# Patient Record
Sex: FEMALE | HISPANIC OR LATINO
[De-identification: names, ages, dates, MRNs, and addresses within clinical notes are randomized per-mention and may not be internally consistent; named-entity substitution may affect disease eponyms.]

---

## 2021-05-04 PROBLEM — Z00.00 ENCOUNTER FOR PREVENTIVE HEALTH EXAMINATION: Status: ACTIVE | Noted: 2021-05-04

## 2021-05-10 ENCOUNTER — APPOINTMENT (OUTPATIENT)
Dept: COLORECTAL SURGERY | Facility: CLINIC | Age: 26
End: 2021-05-10
Payer: COMMERCIAL

## 2021-05-10 VITALS
WEIGHT: 216 LBS | OXYGEN SATURATION: 100 % | HEART RATE: 76 BPM | SYSTOLIC BLOOD PRESSURE: 125 MMHG | TEMPERATURE: 98.3 F | BODY MASS INDEX: 30.92 KG/M2 | HEIGHT: 70 IN | DIASTOLIC BLOOD PRESSURE: 81 MMHG

## 2021-05-10 DIAGNOSIS — Z80.1 FAMILY HISTORY OF MALIGNANT NEOPLASM OF TRACHEA, BRONCHUS AND LUNG: ICD-10-CM

## 2021-05-10 DIAGNOSIS — Z78.9 OTHER SPECIFIED HEALTH STATUS: ICD-10-CM

## 2021-05-10 DIAGNOSIS — K64.4 RESIDUAL HEMORRHOIDAL SKIN TAGS: ICD-10-CM

## 2021-05-10 DIAGNOSIS — Z72.3 LACK OF PHYSICAL EXERCISE: ICD-10-CM

## 2021-05-10 DIAGNOSIS — Z87.39 PERSONAL HISTORY OF OTHER DISEASES OF THE MUSCULOSKELETAL SYSTEM AND CONNECTIVE TISSUE: ICD-10-CM

## 2021-05-10 DIAGNOSIS — Z82.49 FAMILY HISTORY OF ISCHEMIC HEART DISEASE AND OTHER DISEASES OF THE CIRCULATORY SYSTEM: ICD-10-CM

## 2021-05-10 DIAGNOSIS — Z80.8 FAMILY HISTORY OF MALIGNANT NEOPLASM OF OTHER ORGANS OR SYSTEMS: ICD-10-CM

## 2021-05-10 DIAGNOSIS — Z87.898 PERSONAL HISTORY OF OTHER SPECIFIED CONDITIONS: ICD-10-CM

## 2021-05-10 DIAGNOSIS — N32.89 OTHER SPECIFIED DISORDERS OF BLADDER: ICD-10-CM

## 2021-05-10 PROCEDURE — 46600 DIAGNOSTIC ANOSCOPY SPX: CPT

## 2021-05-10 PROCEDURE — 99072 ADDL SUPL MATRL&STAF TM PHE: CPT

## 2021-05-10 PROCEDURE — 99203 OFFICE O/P NEW LOW 30 MIN: CPT | Mod: 25

## 2021-05-10 RX ORDER — MIRABEGRON 50 MG/1
50 TABLET, FILM COATED, EXTENDED RELEASE ORAL
Refills: 0 | Status: ACTIVE | COMMUNITY

## 2021-05-10 NOTE — REVIEW OF SYSTEMS
[As Noted in HPI] : as noted in HPI [Anxiety] : anxiety [Depression] : depression [Easy Bleeding] : a tendency for easy bleeding [Easy Bruising] : a tendency for easy bruising [Negative] : Endocrine [FreeTextEntry7] : anal itchiness [de-identified] : stress, sleep problems [de-identified] : anemia

## 2021-05-10 NOTE — PHYSICAL EXAM
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\par \par General no acute distress, alert and oriented\par Psych calm, pleasant demeanor, responding appropriately to questions\par Nonlabored breathing\par Ambulating without assistance\par Skin normal color and pigment, no visible lesions or rashes\par \par Anorectal Exam:\par Inspection no erythema, induration or fluctuance, no skin excoriation, no fissure, small right posterior residual hemorrhoidal tag, right anterior external hemorrhoid\par AILEEN nontender, no masses palpated, no blood on gloved finger\par \par Procedure: Anoscopy\par \par Pre procedure Diagnosis: external hemorrhoid/tag\par Post procedure Diagnosis: external hemorrhoid/tag\par Anesthesia: none\par Estimated blood loss: none\par Specimen: none\par Complications: none\par \par Consent obtained. Anoscopy was performed by passing a lighted anoscope with lubricant jelly into the anal canal and the entire anal mucosal surface was inspected. Findings included no fissure, noninflamed internal hemorrhoid columns, no visible masses or lesions, \par \par Patient tolerated examination and procedure well.\par \par \par

## 2021-05-10 NOTE — ASSESSMENT
[FreeTextEntry1] : Exam findings and diagnosis were discussed at length with patient. \par Recommendations including increased fiber intake, adequate daily hydration, stool softeners as needed, and sitz baths as needed and after bowel movements were discussed. Consider fiber supplement\par Medical management, such as topical cream, was discussed.\par Office based treatment was discussed as an alternative for internal hemorrhoids\par Role of hemorrhoidectomy also discussed.\par \par Patient interested in further management of external hemorrhoids/tag\par Recommend patient consider external hemorrhoidectomy\par The nature of the procedure as well as risks and benefits were reviewed with the patient. Risk/benefits/alternatives discussed at length, including but not limited to pain, bleeding, infection, swelling, recurrence of symptoms, need for future surgery, scarring and anal stenosis, and risk of alteration of bowel habits, which may be temporary or permanent. The preoperative, intraoperative, and postoperative management were discussed with the patient in detail. All questions were answered, patient expressed understanding, and she would like more time to consider her options\par \par She will contact our office if she would like to proceed with external hemorrhoidectomy.\par

## 2021-05-10 NOTE — HISTORY OF PRESENT ILLNESS
[FreeTextEntry1] : 25yo females presents for initial evaluation\par Referred by GI Dr Lelo Jaquez\par \par H/o hemorrhoids that began 6-7 months ago that patient feels externally, would come and go\par When symptoms first started, noted pain with BM, pain with straining and BRB\par Noted some constipation at that time\par \par States she is vegetarian x 2 years ; however during COVID19 pandemic she started eating meat, which caused a change in bowel habits\par Now is vegetarian again for past 8 months\par \par Now for past 2-3 months persistently present external tissue is felt by patient, makes patient self-conscious/concerns with intimacy\par Denies recent episodes of BRB\par \par Saw GI Dr Jaquez, who advised surgical consultation\par Started increasing fiber in diet at advice of Dr Jaquez, considering starting a supplement\par Reports adequate water intake\par \par Denies coffee or soda intake\par \par Sexually active, denies anally receptive intercourse\par \par H/o bladder spasms, urinary urgency and frequency; now on medication with improved control of symptoms\par Denies pelvic floor physical therapy consultation; doing Kegels on own\par \par Denies fecal urgency or incontinence\par Moves bowels 2x/day, reports pushing/straining every few days\par \par Moved to [place] from [place] in Dec 2020\par \par Never had a colonoscopy\par Denies family history of CRC or IBD

## 2022-09-08 ENCOUNTER — APPOINTMENT (OUTPATIENT)
Dept: COLORECTAL SURGERY | Facility: CLINIC | Age: 27
End: 2022-09-08